# Patient Record
Sex: MALE | Race: WHITE
[De-identification: names, ages, dates, MRNs, and addresses within clinical notes are randomized per-mention and may not be internally consistent; named-entity substitution may affect disease eponyms.]

---

## 2019-01-15 ENCOUNTER — HOSPITAL ENCOUNTER (OUTPATIENT)
Dept: HOSPITAL 61 - SURG | Age: 59
Discharge: HOME | End: 2019-01-15
Attending: INTERNAL MEDICINE
Payer: OTHER GOVERNMENT

## 2019-01-15 VITALS — DIASTOLIC BLOOD PRESSURE: 68 MMHG | SYSTOLIC BLOOD PRESSURE: 110 MMHG

## 2019-01-15 DIAGNOSIS — E03.9: ICD-10-CM

## 2019-01-15 DIAGNOSIS — Z82.3: ICD-10-CM

## 2019-01-15 DIAGNOSIS — K22.70: Primary | ICD-10-CM

## 2019-01-15 DIAGNOSIS — Z88.2: ICD-10-CM

## 2019-01-15 DIAGNOSIS — M19.90: ICD-10-CM

## 2019-01-15 DIAGNOSIS — Z72.0: ICD-10-CM

## 2019-01-15 DIAGNOSIS — Z98.52: ICD-10-CM

## 2019-01-15 DIAGNOSIS — Z83.3: ICD-10-CM

## 2019-01-15 DIAGNOSIS — Z88.0: ICD-10-CM

## 2019-01-15 DIAGNOSIS — Z79.899: ICD-10-CM

## 2019-01-15 DIAGNOSIS — Z82.49: ICD-10-CM

## 2019-01-15 DIAGNOSIS — Z72.89: ICD-10-CM

## 2019-01-15 DIAGNOSIS — Z85.828: ICD-10-CM

## 2019-01-15 DIAGNOSIS — K21.9: ICD-10-CM

## 2019-01-15 DIAGNOSIS — G47.30: ICD-10-CM

## 2019-01-15 DIAGNOSIS — I10: ICD-10-CM

## 2019-01-15 PROCEDURE — 43239 EGD BIOPSY SINGLE/MULTIPLE: CPT

## 2019-01-15 PROCEDURE — 88305 TISSUE EXAM BY PATHOLOGIST: CPT

## 2019-01-17 NOTE — PATHOLOGY
[facility] Accession Number: [id number]

.                                                                01

Material submitted:                                        .

DISTAL ESOPHAGUS BIOPSY

.                                                                01

Clinical history:                                          .

Hx Savage's

.                                                                02

**********************************************************************

Diagnosis:

Esophageal biopsies, distal esophagus:

- Segments of hyperplastic squamous esophageal mucosa and esophagogastric

mucosa showing chronic inflammation, consistent with reflux esophagitis.

(JPM:erin; 01/17/2019)

QMS/01/17/2019

**********************************************************************

.                                                                02

Comment:

Sections of the distal esophageal biopsy reveal several segments of

focally tangentially oriented hyperplastic squamous esophageal mucosa and

a segment of esophagogastric mucosa showing mild to moderate chronic

inflammation.  The findings are consistent with reflux esophagitis.  There

is no evidence of Savage change, dysplasia, or malignancy.

(JPM:erin; 01/17/2019)

.                                                                02

Electronically signed:                                     .

Allan Wise MD, Pathologist

NPI- [id number]

.                                                                01

Gross description:                                         .

Received in formalin labeled ""Karl Tristan, distal esophagus BX," are 5

segments of tan soft tissue measuring 1.1 x 0.9 x 0.2 cm in aggregate

dimensions and ranging from 0.3 to 0.5 cm in maximum dimension.  The

specimen is submitted entirely in cassette A1.

(TSD; 1/16/2019)

TOB/TOB

.                                                                02

Pathologist provided ICD-10:

K21.0

.                                                                02

CPT                                                        .

[id number]

Specimen Comment: A courtesy copy of this report has been sent to

Specimen Comment: [phone], [phone].

Specimen Comment: Report sent to  and 

***Performed at:  01

   [address], [place], [place]  [id number]

   MD Pancho Loyola MD Phone:  [phone]

***Performed at:  02

   [facility]

   [address], [place]  [id number]

   MD Allan Wise MD Phone:  [phone]